# Patient Record
Sex: FEMALE | URBAN - METROPOLITAN AREA
[De-identification: names, ages, dates, MRNs, and addresses within clinical notes are randomized per-mention and may not be internally consistent; named-entity substitution may affect disease eponyms.]

---

## 2020-03-12 ENCOUNTER — IMPORTED ENCOUNTER (OUTPATIENT)
Dept: URBAN - METROPOLITAN AREA CLINIC 1 | Facility: CLINIC | Age: 65
End: 2020-03-12

## 2020-03-12 PROBLEM — H16.143: Noted: 2020-03-12

## 2020-03-12 PROBLEM — H04.123: Noted: 2020-03-12

## 2020-03-12 PROBLEM — H25.813: Noted: 2020-03-12

## 2020-03-12 PROCEDURE — 92004 COMPRE OPH EXAM NEW PT 1/>: CPT

## 2020-03-12 PROCEDURE — 92015 DETERMINE REFRACTIVE STATE: CPT

## 2020-03-12 NOTE — PATIENT DISCUSSION
1.  Cataract OU -- Observe for now without intervention. The patient was advised to contact us if any change or worsening of vision2. JOSE w/ PEK OU -- (H/o plugs OU Punctas Open OU) Recommend continue the use of ATs TID-QID OU routinely. H/o failed trial of Restasis. Finalized Glasses Mrx today. Return for an appointment in 1 year for a 30/glare with Dr. Mary Fay.

## 2022-04-02 ASSESSMENT — VISUAL ACUITY
OS_CC: 20/20
OD_CC: 20/20
OS_SC: 20/20
OS_SC: J3
OD_GLARE: 20/50
OS_GLARE: 20/50
OD_SC: 20/25
OD_SC: J3

## 2022-04-02 ASSESSMENT — TONOMETRY
OD_IOP_MMHG: 15
OS_IOP_MMHG: 15

## 2022-06-13 ENCOUNTER — EMERGENCY VISIT (OUTPATIENT)
Dept: URBAN - METROPOLITAN AREA CLINIC 1 | Facility: CLINIC | Age: 67
End: 2022-06-13

## 2022-06-13 DIAGNOSIS — H35.61: ICD-10-CM

## 2022-06-13 DIAGNOSIS — H43.811: ICD-10-CM

## 2022-06-13 DIAGNOSIS — H16.143: ICD-10-CM

## 2022-06-13 DIAGNOSIS — H04.123: ICD-10-CM

## 2022-06-13 DIAGNOSIS — H25.813: ICD-10-CM

## 2022-06-13 PROCEDURE — 92014 COMPRE OPH EXAM EST PT 1/>: CPT

## 2022-06-13 ASSESSMENT — TONOMETRY
OD_IOP_MMHG: 16
OS_IOP_MMHG: 15

## 2022-06-13 ASSESSMENT — VISUAL ACUITY
OD_SC: 20/20
OS_SC: 20/20-1

## 2023-05-30 ENCOUNTER — COMPREHENSIVE EXAM (OUTPATIENT)
Dept: URBAN - METROPOLITAN AREA CLINIC 1 | Facility: CLINIC | Age: 68
End: 2023-05-30

## 2023-05-30 ASSESSMENT — TONOMETRY
OD_IOP_MMHG: 17
OS_IOP_MMHG: 17

## 2023-05-30 ASSESSMENT — VISUAL ACUITY
OD_CC: 20/20
OS_CC: 20/20
OS_CC: J1+
OD_CC: J1+